# Patient Record
Sex: MALE | Race: OTHER | URBAN - METROPOLITAN AREA
[De-identification: names, ages, dates, MRNs, and addresses within clinical notes are randomized per-mention and may not be internally consistent; named-entity substitution may affect disease eponyms.]

---

## 2022-06-12 ENCOUNTER — INPATIENT (INPATIENT)
Facility: HOSPITAL | Age: 37
LOS: 2 days | Discharge: HOME | End: 2022-06-15
Attending: INTERNAL MEDICINE | Admitting: INTERNAL MEDICINE
Payer: MEDICAID

## 2022-06-12 VITALS
OXYGEN SATURATION: 98 % | HEART RATE: 80 BPM | RESPIRATION RATE: 16 BRPM | TEMPERATURE: 99 F | SYSTOLIC BLOOD PRESSURE: 140 MMHG | HEIGHT: 66 IN | DIASTOLIC BLOOD PRESSURE: 114 MMHG | WEIGHT: 179.02 LBS

## 2022-06-12 LAB — GLUCOSE BLDC GLUCOMTR-MCNC: 92 MG/DL — SIGNIFICANT CHANGE UP (ref 70–99)

## 2022-06-12 PROCEDURE — 99285 EMERGENCY DEPT VISIT HI MDM: CPT

## 2022-06-12 PROCEDURE — 70450 CT HEAD/BRAIN W/O DYE: CPT | Mod: 26,MA

## 2022-06-12 PROCEDURE — 71045 X-RAY EXAM CHEST 1 VIEW: CPT | Mod: 26

## 2022-06-12 PROCEDURE — 93010 ELECTROCARDIOGRAM REPORT: CPT

## 2022-06-12 NOTE — ED ADULT NURSE NOTE - ED STAT RN HANDOFF DETAILS
Report endorsed to oncoming RN Ruthy. Safety checks compld this shift/Safety rounds completed hourly.  IV sites checked Q2+remains WDL. Meds given as ord with no s/s of adverse RXNs. Fall +skin precs in place. Any issues endorsed to oncoming RN for follow up. Report endorsed to oncoming RN Norma. Safety checks compld this shift/Safety rounds completed hourly.  IV sites checked Q2+remains WDL. Meds given as ord with no s/s of adverse RXNs. Fall +skin precs in place. Any issues endorsed to oncoming RN for follow up.

## 2022-06-12 NOTE — ED ADULT NURSE NOTE - ED STAT RN HANDOFF DETAILS 2
Pt endorsed to this RN to ISO 21. Pt is pending transfer to [facility] from ED to Neuro Floor. Pt has b/l  strength, symmetrical smile, no tongue deviation, sensation intact in b/l upper extremities, pt can move left leg but says he is having difficulty moving right leg. Pt is A+Ox3, calm and cooperative. Unlabored breathing at this time. Abdomen soft and non-distended. Cap refill less than 2 seconds. MD evaluation in progress.

## 2022-06-12 NOTE — ED PROVIDER NOTE - PROGRESS NOTE DETAILS
called transfer center for potential transfer to [facility] and to contact pt.'s neuro/onc doc wife now states that she specifically saw L sided paralysis and L sided vision loss about 8:15 pm tonight, about 2 hours before ER arrival, which prompted her to call patient's neuro/onc doc, who told her to go to the nearest ER  I added CTA head/neck to specifically evaluate for large vessel embolus  still pending callback from transfer center regarding potential transfer to [facility] CTA negative  pt. accepted for transfer to [facility]: Pt will be admitted to [facility] (d/w Dr Bolton), pending eventual transfer to [facility].

## 2022-06-12 NOTE — ED PROVIDER NOTE - NSPREEXISTRELATION_ED_A_ED_FT
[Follow-Up: _____] : a [unfilled] follow-up visit [FreeTextEntry1] : Telephonic visit neuro/onc pt. at [facility]

## 2022-06-12 NOTE — ED PROVIDER NOTE - CARE PLAN
Principal Discharge DX:	CVA (cerebrovascular accident)   1 Principal Discharge DX:	Left-sided weakness

## 2022-06-12 NOTE — ED PROVIDER NOTE - CLINICAL SUMMARY MEDICAL DECISION MAKING FREE TEXT BOX
35 yo M with likely CVA, last known normal was 2 days prior, out of window for TPA  -basic labs, coags, ammonia, lactate, finger stick, rvp, trop, type and screen, ua, CT brain, CXR, ekg, iv, monitor  -f/u results, reeval

## 2022-06-12 NOTE — ED ADULT TRIAGE NOTE - CHIEF COMPLAINT QUOTE
hx inoperable brain tumor and receiving chemo. c/o left sided weakness. last seen normal, as per wife, is Friday. Dr. Evans paged to triage for stroke eval

## 2022-06-12 NOTE — ED PROVIDER NOTE - OBJECTIVE STATEMENT
37 yo M with L sided paralysis, decreased speech, R gaze palsy, noted by daughter today.  Last seen normal was Friday.  Pt. decompensating over last 2 days.  No other complaints or inciting event.   ROS: negative for fever, cough, headache, chest pain, shortness of breath, abd pain, nausea, vomiting, diarrhea, rash, paresthesia, and weakness--all other systems reviewed are negative.   PMH: inoperable brain tumor on chemo; Meds: Denies; SH: Denies smoking/drinking/drug use 35 yo M with L sided paralysis, decreased speech, R gaze palsy, noted by daughter today.  Last seen normal was Friday.  Pt. decompensating over last 2 days.  No other complaints or inciting event.  Known history of brain tumor s/p 2 resections and regrowth, now non-operable.    ROS: negative for fever, cough, headache, chest pain, shortness of breath, abd pain, nausea, vomiting, diarrhea, rash, paresthesia, and weakness--all other systems reviewed are negative.   PMH: Glioblastoma discovered August 2021, 2 surgeries (Aug 9, Nov 29), now inoperable on chemo; Meds: Lenvima, Keppra, Keytruda chemo infusions; SH: Denies smoking/drinking/drug use  History from Wife at bedside: Adina  PCP: STEVEN Paula 35 yo M with L sided paralysis, decreased speech, R gaze palsy, noted by wife today.  Last seen/known normal was Friday.  Pt. reportedly decompensating over last 2 days.  No other complaints or inciting event.  Known history of brain tumor s/p 2 resections and regrowth, now non-operable.    ROS: negative for fever, cough, headache, chest pain, shortness of breath, abd pain, nausea, vomiting, diarrhea, rash, paresthesia, and weakness--all other systems reviewed are negative.   PMH: Glioblastoma discovered August 2021, 2 surgeries (Aug 9, Nov 29), now inoperable on chemo; Meds: Lenvima, Keppra, Keytruda chemo infusions; SH: Denies smoking/drinking/drug use  History from Wife at bedside: Adina  Neuro/Onc: Dr. Hai Reaves, [facility] 35 yo M with L sided paralysis, decreased speech, R gaze palsy, noted by wife today--states that pt. had symptoms on waking this morning.  Last seen/known normal was Friday, but wife also admits pt. was physically weak all week.  Pt. reportedly decompensating over last 2 days.  Pt. could not get out of bed this morning, as per wife, due to weakness.  No other complaints or inciting event.  Known history of brain tumor s/p 2 resections and regrowth, now non-operable.    ROS: negative for fever, cough, headache, chest pain, shortness of breath, abd pain, nausea, vomiting, diarrhea, rash, paresthesia, and weakness--all other systems reviewed are negative.   PMH: Glioblastoma discovered August 2021, 2 surgeries (Aug 9, Nov 29), now inoperable on chemo; Meds: Lenvima, Keppra, Keytruda chemo infusions; SH: Denies smoking/drinking/drug use  History from Wife at bedside: Adina  Neuro/Onc: Dr. Hai Reaves, [place]

## 2022-06-12 NOTE — ED ADULT NURSE NOTE - OBJECTIVE STATEMENT
37 yo M with L sided paralysis, decreased speech, R gaze palsy, noted by daughter today.  Last seen normal was Friday.  Pt. decompensating over last 2 days.  No other complaints or inciting event.  Known history of brain tumor s/p 2 resections and regrowth, now non-operable.  Denies any pain or discomfort at this time. 35 yo M hx of Glioblastoma in head, presents to ED with L sided paralysis, decreased speech, R gaze palsy, noted by wife today, unknown time.  According to wife, pt was Last seen normal was Friday.  Pt. decompensating over last 2 days.  No other complaints or inciting event.  Known history of brain tumor s/p 2 resections and regrowth, now non-operable.  Denies any pain or discomfort at this time. 37 yo M AOx4, nkda, hx of Glioblastoma of the brain presents to ED with L sided paralysis, decreased speech, R gaze palsy, noted by wife today, unknown time.  According to wife, pt was Last seen normal Friday but had symptoms of weakness throughout the week.  Pt. decompensating over last 2 days.   Known history of brain tumor s/p 2 resections and regrowth, now non-operable.  Denies any chest pain, n/v/d, sob, abd pain, recent travels or sickness,  pain or discomfort at this time

## 2022-06-12 NOTE — ED PROVIDER NOTE - PHYSICAL EXAMINATION
Vitals: HTN at 140/114, otherwise WNL  Gen: slow to respond, difficulty speaking, laying uncomfortably in stretcher  Head: ncat, perrla, R gaze palsy   Neck: supple, no lymphadenopathy, no midline deviation  Heart: rrr, no m/r/g  Lungs: CTA b/l, no rales/ronchi/wheezes  Abd: soft, nontender, non-distended, no rebound or guarding  Ext: no clubbing/cyanosis/edema  Neuro: L sided UE and LE weakness, minimal motion against gravity Vitals: HTN at 140/114, otherwise WNL  Gen: slow to respond, speaking in Ecuadorean with slight aphasia, laying uncomfortably in stretcher  Head: ncat, perrla, R gaze palsy   Neck: supple, no lymphadenopathy, no midline deviation  Heart: rrr, no m/r/g  Lungs: CTA b/l, no rales/ronchi/wheezes  Abd: soft, nontender, non-distended, no rebound or guarding  Ext: no clubbing/cyanosis/edema  Neuro: L sided UE and LE paralysis Vitals: HTN at 140/114, otherwise WNL  Gen: slow to respond, speaking in Vincentian with slight aphasia, laying comfortably in stretcher  Head: ncat, perrla, R gaze palsy   Neck: supple, no lymphadenopathy, no midline deviation  Heart: rrr, no m/r/g  Lungs: CTA b/l, no rales/ronchi/wheezes  Abd: soft, nontender, non-distended, no rebound or guarding  Ext: no clubbing/cyanosis/edema  Neuro: L sided UE and LE paralysis

## 2022-06-13 DIAGNOSIS — C71.9 MALIGNANT NEOPLASM OF BRAIN, UNSPECIFIED: ICD-10-CM

## 2022-06-13 DIAGNOSIS — R00.0 TACHYCARDIA, UNSPECIFIED: ICD-10-CM

## 2022-06-13 DIAGNOSIS — G81.94 HEMIPLEGIA, UNSPECIFIED AFFECTING LEFT NONDOMINANT SIDE: ICD-10-CM

## 2022-06-13 LAB
ALBUMIN SERPL ELPH-MCNC: 3.8 G/DL — SIGNIFICANT CHANGE UP (ref 3.3–5)
ALP SERPL-CCNC: 64 U/L — SIGNIFICANT CHANGE UP (ref 40–120)
ALT FLD-CCNC: 26 U/L — SIGNIFICANT CHANGE UP (ref 12–78)
AMMONIA BLD-MCNC: 18 UMOL/L — SIGNIFICANT CHANGE UP (ref 11–32)
ANION GAP SERPL CALC-SCNC: 8 MMOL/L — SIGNIFICANT CHANGE UP (ref 5–17)
APPEARANCE UR: CLEAR — SIGNIFICANT CHANGE UP
APTT BLD: 30.4 SEC — SIGNIFICANT CHANGE UP (ref 27.5–35.5)
AST SERPL-CCNC: 24 U/L — SIGNIFICANT CHANGE UP (ref 15–37)
BASOPHILS # BLD AUTO: 0.03 K/UL — SIGNIFICANT CHANGE UP (ref 0–0.2)
BASOPHILS NFR BLD AUTO: 0.7 % — SIGNIFICANT CHANGE UP (ref 0–2)
BILIRUB SERPL-MCNC: 0.7 MG/DL — SIGNIFICANT CHANGE UP (ref 0.2–1.2)
BILIRUB UR-MCNC: NEGATIVE — SIGNIFICANT CHANGE UP
BLD GP AB SCN SERPL QL: SIGNIFICANT CHANGE UP
BUN SERPL-MCNC: 7 MG/DL — SIGNIFICANT CHANGE UP (ref 7–23)
CALCIUM SERPL-MCNC: 9.9 MG/DL — SIGNIFICANT CHANGE UP (ref 8.5–10.1)
CHLORIDE SERPL-SCNC: 103 MMOL/L — SIGNIFICANT CHANGE UP (ref 96–108)
CHOLEST SERPL-MCNC: 176 MG/DL — SIGNIFICANT CHANGE UP
CO2 SERPL-SCNC: 27 MMOL/L — SIGNIFICANT CHANGE UP (ref 22–31)
COLOR SPEC: YELLOW — SIGNIFICANT CHANGE UP
CREAT SERPL-MCNC: 0.76 MG/DL — SIGNIFICANT CHANGE UP (ref 0.5–1.3)
DIFF PNL FLD: NEGATIVE — SIGNIFICANT CHANGE UP
EGFR: 119 ML/MIN/1.73M2 — SIGNIFICANT CHANGE UP
EOSINOPHIL # BLD AUTO: 0.01 K/UL — SIGNIFICANT CHANGE UP (ref 0–0.5)
EOSINOPHIL NFR BLD AUTO: 0.2 % — SIGNIFICANT CHANGE UP (ref 0–6)
GLUCOSE SERPL-MCNC: 99 MG/DL — SIGNIFICANT CHANGE UP (ref 70–99)
GLUCOSE UR QL: NEGATIVE MG/DL — SIGNIFICANT CHANGE UP
HCT VFR BLD CALC: 47.7 % — SIGNIFICANT CHANGE UP (ref 39–50)
HDLC SERPL-MCNC: 20 MG/DL — LOW
HGB BLD-MCNC: 16.4 G/DL — SIGNIFICANT CHANGE UP (ref 13–17)
IMM GRANULOCYTES NFR BLD AUTO: 0 % — SIGNIFICANT CHANGE UP (ref 0–1.5)
INR BLD: 1.31 RATIO — HIGH (ref 0.88–1.16)
KETONES UR-MCNC: ABNORMAL
LACTATE SERPL-SCNC: 1.2 MMOL/L — SIGNIFICANT CHANGE UP (ref 0.7–2)
LEUKOCYTE ESTERASE UR-ACNC: ABNORMAL
LIPID PNL WITH DIRECT LDL SERPL: 125 MG/DL — HIGH
LYMPHOCYTES # BLD AUTO: 1.84 K/UL — SIGNIFICANT CHANGE UP (ref 1–3.3)
LYMPHOCYTES # BLD AUTO: 43.6 % — SIGNIFICANT CHANGE UP (ref 13–44)
MCHC RBC-ENTMCNC: 29.3 PG — SIGNIFICANT CHANGE UP (ref 27–34)
MCHC RBC-ENTMCNC: 34.4 G/DL — SIGNIFICANT CHANGE UP (ref 32–36)
MCV RBC AUTO: 85.3 FL — SIGNIFICANT CHANGE UP (ref 80–100)
MONOCYTES # BLD AUTO: 0.52 K/UL — SIGNIFICANT CHANGE UP (ref 0–0.9)
MONOCYTES NFR BLD AUTO: 12.3 % — SIGNIFICANT CHANGE UP (ref 2–14)
NEUTROPHILS # BLD AUTO: 1.82 K/UL — SIGNIFICANT CHANGE UP (ref 1.8–7.4)
NEUTROPHILS NFR BLD AUTO: 43.2 % — SIGNIFICANT CHANGE UP (ref 43–77)
NITRITE UR-MCNC: NEGATIVE — SIGNIFICANT CHANGE UP
NON HDL CHOLESTEROL: 156 MG/DL — HIGH
NRBC # BLD: 0 /100 WBCS — SIGNIFICANT CHANGE UP (ref 0–0)
PH UR: 6 — SIGNIFICANT CHANGE UP (ref 5–8)
PLATELET # BLD AUTO: 200 K/UL — SIGNIFICANT CHANGE UP (ref 150–400)
POTASSIUM SERPL-MCNC: 4.1 MMOL/L — SIGNIFICANT CHANGE UP (ref 3.5–5.3)
POTASSIUM SERPL-SCNC: 4.1 MMOL/L — SIGNIFICANT CHANGE UP (ref 3.5–5.3)
PROT SERPL-MCNC: 9 GM/DL — HIGH (ref 6–8.3)
PROT UR-MCNC: 30 MG/DL
PROTHROM AB SERPL-ACNC: 15.6 SEC — HIGH (ref 10.5–13.4)
RAPID RVP RESULT: DETECTED
RBC # BLD: 5.59 M/UL — SIGNIFICANT CHANGE UP (ref 4.2–5.8)
RBC # FLD: 13.9 % — SIGNIFICANT CHANGE UP (ref 10.3–14.5)
RBC CASTS # UR COMP ASSIST: SIGNIFICANT CHANGE UP /HPF (ref 0–4)
RV+EV RNA SPEC QL NAA+PROBE: DETECTED
SARS-COV-2 RNA SPEC QL NAA+PROBE: SIGNIFICANT CHANGE UP
SODIUM SERPL-SCNC: 138 MMOL/L — SIGNIFICANT CHANGE UP (ref 135–145)
SP GR SPEC: 1.01 — SIGNIFICANT CHANGE UP (ref 1.01–1.02)
TRIGL SERPL-MCNC: 157 MG/DL — HIGH
TROPONIN I, HIGH SENSITIVITY RESULT: 4.9 NG/L — SIGNIFICANT CHANGE UP
UROBILINOGEN FLD QL: 4 MG/DL
WBC # BLD: 4.22 K/UL — SIGNIFICANT CHANGE UP (ref 3.8–10.5)
WBC # FLD AUTO: 4.22 K/UL — SIGNIFICANT CHANGE UP (ref 3.8–10.5)
WBC UR QL: SIGNIFICANT CHANGE UP

## 2022-06-13 PROCEDURE — 70498 CT ANGIOGRAPHY NECK: CPT | Mod: 26,MA

## 2022-06-13 PROCEDURE — 70496 CT ANGIOGRAPHY HEAD: CPT | Mod: 26,MA

## 2022-06-13 PROCEDURE — 70552 MRI BRAIN STEM W/DYE: CPT | Mod: 26,MG

## 2022-06-13 PROCEDURE — G1004: CPT

## 2022-06-13 PROCEDURE — 99283 EMERGENCY DEPT VISIT LOW MDM: CPT

## 2022-06-13 RX ORDER — SODIUM CHLORIDE 9 MG/ML
1000 INJECTION INTRAMUSCULAR; INTRAVENOUS; SUBCUTANEOUS ONCE
Refills: 0 | Status: COMPLETED | OUTPATIENT
Start: 2022-06-13 | End: 2022-06-13

## 2022-06-13 RX ORDER — GLUCAGON INJECTION, SOLUTION 0.5 MG/.1ML
1 INJECTION, SOLUTION SUBCUTANEOUS ONCE
Refills: 0 | Status: DISCONTINUED | OUTPATIENT
Start: 2022-06-13 | End: 2022-06-15

## 2022-06-13 RX ORDER — DEXTROSE 50 % IN WATER 50 %
12.5 SYRINGE (ML) INTRAVENOUS ONCE
Refills: 0 | Status: DISCONTINUED | OUTPATIENT
Start: 2022-06-13 | End: 2022-06-15

## 2022-06-13 RX ORDER — DEXAMETHASONE 0.5 MG/5ML
10 ELIXIR ORAL ONCE
Refills: 0 | Status: DISCONTINUED | OUTPATIENT
Start: 2022-06-13 | End: 2022-06-13

## 2022-06-13 RX ORDER — LEVETIRACETAM 250 MG/1
500 TABLET, FILM COATED ORAL EVERY 12 HOURS
Refills: 0 | Status: DISCONTINUED | OUTPATIENT
Start: 2022-06-13 | End: 2022-06-13

## 2022-06-13 RX ORDER — DEXTROSE 50 % IN WATER 50 %
25 SYRINGE (ML) INTRAVENOUS ONCE
Refills: 0 | Status: DISCONTINUED | OUTPATIENT
Start: 2022-06-13 | End: 2022-06-15

## 2022-06-13 RX ORDER — DEXTROSE 50 % IN WATER 50 %
15 SYRINGE (ML) INTRAVENOUS ONCE
Refills: 0 | Status: DISCONTINUED | OUTPATIENT
Start: 2022-06-13 | End: 2022-06-15

## 2022-06-13 RX ORDER — DEXAMETHASONE 0.5 MG/5ML
10 ELIXIR ORAL ONCE
Refills: 0 | Status: COMPLETED | OUTPATIENT
Start: 2022-06-13 | End: 2022-06-13

## 2022-06-13 RX ORDER — ACETAMINOPHEN 500 MG
975 TABLET ORAL ONCE
Refills: 0 | Status: COMPLETED | OUTPATIENT
Start: 2022-06-13 | End: 2022-06-13

## 2022-06-13 RX ORDER — LEVETIRACETAM 250 MG/1
500 TABLET, FILM COATED ORAL
Refills: 0 | Status: DISCONTINUED | OUTPATIENT
Start: 2022-06-13 | End: 2022-06-15

## 2022-06-13 RX ORDER — SODIUM CHLORIDE 9 MG/ML
1000 INJECTION INTRAMUSCULAR; INTRAVENOUS; SUBCUTANEOUS
Refills: 0 | Status: DISCONTINUED | OUTPATIENT
Start: 2022-06-13 | End: 2022-06-15

## 2022-06-13 RX ORDER — ACETAMINOPHEN 500 MG
650 TABLET ORAL ONCE
Refills: 0 | Status: COMPLETED | OUTPATIENT
Start: 2022-06-13 | End: 2022-06-13

## 2022-06-13 RX ADMIN — LEVETIRACETAM 500 MILLIGRAM(S): 250 TABLET, FILM COATED ORAL at 13:33

## 2022-06-13 RX ADMIN — SODIUM CHLORIDE 1000 MILLILITER(S): 9 INJECTION INTRAMUSCULAR; INTRAVENOUS; SUBCUTANEOUS at 01:12

## 2022-06-13 RX ADMIN — LEVETIRACETAM 420 MILLIGRAM(S): 250 TABLET, FILM COATED ORAL at 13:18

## 2022-06-13 RX ADMIN — Medication 102 MILLIGRAM(S): at 15:22

## 2022-06-13 RX ADMIN — Medication 975 MILLIGRAM(S): at 01:11

## 2022-06-13 RX ADMIN — Medication 10 MILLIGRAM(S): at 15:52

## 2022-06-13 RX ADMIN — Medication 650 MILLIGRAM(S): at 10:34

## 2022-06-13 RX ADMIN — SODIUM CHLORIDE 1000 MILLILITER(S): 9 INJECTION INTRAMUSCULAR; INTRAVENOUS; SUBCUTANEOUS at 02:56

## 2022-06-13 RX ADMIN — Medication 650 MILLIGRAM(S): at 11:34

## 2022-06-13 RX ADMIN — LEVETIRACETAM 500 MILLIGRAM(S): 250 TABLET, FILM COATED ORAL at 22:21

## 2022-06-13 RX ADMIN — Medication 975 MILLIGRAM(S): at 02:56

## 2022-06-13 RX ADMIN — SODIUM CHLORIDE 75 MILLILITER(S): 9 INJECTION INTRAMUSCULAR; INTRAVENOUS; SUBCUTANEOUS at 19:31

## 2022-06-13 NOTE — CONSULT NOTE ADULT - PROBLEM SELECTOR RECOMMENDATION 2
Sinus tachy, likely due to dehydration  Continue IV NS  Monitor HR  poc glucose and Hypoglycemia protocol

## 2022-06-13 NOTE — CONSULT NOTE ADULT - PROBLEM SELECTOR RECOMMENDATION 3
36 years old male with h/o hamartoblastoma ( s/p surgery x 2 in 08/2021 and 11/2021, on chemo)  Now admitted with left hemiplegia  Pending transfer to [facility]

## 2022-06-13 NOTE — ED ADULT NURSE REASSESSMENT NOTE - NS ED NURSE REASSESS COMMENT FT1
Pt resting comfortably with wife berenice at bedside. Respirations even and unlabored. Nad noted. Pt is able to make all needs known. Cardiac and spo2 monitoring in place. Comfort and safety maintained. Awaiting bed at [facility], pt waiting to be transferred pending bed assignment.

## 2022-06-13 NOTE — CONSULT NOTE ADULT - PROBLEM SELECTOR RECOMMENDATION 9
present to ED with complain of left sided paralysis which was noted on 9PM yesterday ( 6/12/22). Patient could not get out of bed due to weakness. Last known normal was on Friday 6/10/22. Out of window for TPA  CT head with no acute intracranial hemorrhage or mass effect. Status post right frontal temporal craniotomy with and a surgical cavity in the right frontal region. CTA head/neck with patent major arterial vasculature  Discussed with neurology Dr Sexton over the phone, likely due to progression of malignancy. Hold of aspirin for risk of bleeding due to tumor. MRI brain with contrast if possible  s/p IV dexamethasone 10mg   Pending transfer to [facility]  Continue Keppra 500mg bid   Neuro check

## 2022-06-13 NOTE — CONSULT NOTE ADULT - ASSESSMENT
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 Patient could not get out of bed due to weakness. Last known normal was on Friday 6/10/22. No fever or chills.   Tachycardic, sat well at RA, BP stable. No leukocytosis, Plt 200, Cr 4.1, glucose 99, , hsTnT 4.9. CXR image reviewed, no focal consolidation. CT head with no acute intracranial hemorrhage or mass effect. Status post right frontal temporal craniotomy with and a surgical cavity in the right frontal region. CTA head/neck with patent major arterial vasculature.

## 2022-06-13 NOTE — ED ADULT NURSE REASSESSMENT NOTE - NS ED NURSE REASSESS COMMENT FT1
Pt resting in bed, on cardiac monitor, NAD noted.  Noted slight improvement in movement of left side of pts upper and lower extremity, Dr. Evans made aware. Pending transfer to [facility].  Plan of care updated.

## 2022-06-14 LAB
GLUCOSE BLDC GLUCOMTR-MCNC: 118 MG/DL — HIGH (ref 70–99)
GLUCOSE BLDC GLUCOMTR-MCNC: 142 MG/DL — HIGH (ref 70–99)

## 2022-06-14 PROCEDURE — 99232 SBSQ HOSP IP/OBS MODERATE 35: CPT

## 2022-06-14 RX ORDER — OXYCODONE HYDROCHLORIDE 5 MG/1
5 TABLET ORAL EVERY 6 HOURS
Refills: 0 | Status: DISCONTINUED | OUTPATIENT
Start: 2022-06-14 | End: 2022-06-15

## 2022-06-14 RX ORDER — DEXAMETHASONE 0.5 MG/5ML
10 ELIXIR ORAL ONCE
Refills: 0 | Status: COMPLETED | OUTPATIENT
Start: 2022-06-14 | End: 2022-06-14

## 2022-06-14 RX ORDER — MORPHINE SULFATE 50 MG/1
2 CAPSULE, EXTENDED RELEASE ORAL EVERY 4 HOURS
Refills: 0 | Status: DISCONTINUED | OUTPATIENT
Start: 2022-06-14 | End: 2022-06-15

## 2022-06-14 RX ORDER — ENOXAPARIN SODIUM 100 MG/ML
40 INJECTION SUBCUTANEOUS EVERY 24 HOURS
Refills: 0 | Status: DISCONTINUED | OUTPATIENT
Start: 2022-06-14 | End: 2022-06-15

## 2022-06-14 RX ORDER — OXYCODONE HYDROCHLORIDE 5 MG/1
5 TABLET ORAL ONCE
Refills: 0 | Status: DISCONTINUED | OUTPATIENT
Start: 2022-06-14 | End: 2022-06-15

## 2022-06-14 RX ADMIN — Medication 102 MILLIGRAM(S): at 23:12

## 2022-06-14 RX ADMIN — LEVETIRACETAM 500 MILLIGRAM(S): 250 TABLET, FILM COATED ORAL at 10:11

## 2022-06-14 RX ADMIN — ENOXAPARIN SODIUM 40 MILLIGRAM(S): 100 INJECTION SUBCUTANEOUS at 23:12

## 2022-06-14 RX ADMIN — LEVETIRACETAM 500 MILLIGRAM(S): 250 TABLET, FILM COATED ORAL at 17:27

## 2022-06-14 RX ADMIN — MORPHINE SULFATE 2 MILLIGRAM(S): 50 CAPSULE, EXTENDED RELEASE ORAL at 23:12

## 2022-06-14 NOTE — ED ADULT NURSE REASSESSMENT NOTE - NS ED NURSE REASSESS COMMENT FT1
Spoke with Dr. Gutierres and asked about patient's disposition because [facility] called for transfer. Dr. Gutierres to keep transfer to [facility] and to have bed board cancel room assignment. NM notified.

## 2022-06-14 NOTE — CHART NOTE - NSCHARTNOTEFT_GEN_A_CORE
Called by RN for pt c/o new onset pain and weakness to RLE.  Pt seen at bedside with his mother and  #[id number]/Guerda and later d/w pt's wife via phone  Pt follows commands but is minimally verbal; pt's mother answers most questions for him  Pt reports he presented initially for left sided weakness which has persisted. He now reports difficulty Called by RN for pt c/o new onset pain and weakness to RLE.  Pt seen at bedside with his mother and  #[id number]/Guerda and later d/w pt's wife via phone  Pt follows commands but is minimally verbal; pt's mother answers most questions for him  Pt reports he presented initially for left sided weakness which has persisted. He now reports increased pain and decreased movement of his RLE.  Unable to say if onset was gradual or sudden tonight, but he is now noticing it and unable to be comfortable.  Pt requests something stronger than tylenol for pain stating it really hasn't helped him. Pt and mom agree that he is able to take po meds and food at this time.    T(C): , Max: 37.2 (06-14-22 @ 07:29)  HR: 107 (06-14-22 @ 17:25) (101 - 115)  BP: 126/94 (06-14-22 @ 17:25) (126/94 - 146/102)  RR: 18 (06-14-22 @ 17:25) (15 - 20)  SpO2: 97% (06-14-22 @ 17:25) (96% - 98%)    Pt supine in bed. Holding cell phone in right hand  pt follows commands but is minimally verbal; says hello and alesia   RUE 3/5  RLE 2/5   LLE 2/5 with clonus    This is a 37yo M h/o glioblastoma s/p sx x2 and chemo, now in-operable presents with left sided parasthesia, CTH negative for acute bleed or stroke, likely representing progression of disease.  Pt is awaiting transfer to [facility] for continuity of care when bed is available.    Exam at this time does show weakness of bilat LE, unclear if changed from presenting complaints, but again likely represents known lesions.  - will tx pain with oxy ir 5mg po x1 for now, can offer additional meds prn  - will give additional dose decadron 10mg ivpb x 1   - start lovenox 40mg sq daily tonight for vte ppx  - spoke with [facility] transfer [place] regarding pending [facility] txf - transfer center last spoke with [facility] this afternoon and no beds were available. Txf center will continue communication and txf when bed available.  - continue to monitor

## 2022-06-14 NOTE — ED ADULT NURSE REASSESSMENT NOTE - NS ED NURSE REASSESS COMMENT FT1
Received patient in [facility]; alert and oriented x4. Denies any pain at this time. IV not flushing, removed. No swelling noted. Pending transfer to [facility].

## 2022-06-15 VITALS
SYSTOLIC BLOOD PRESSURE: 120 MMHG | DIASTOLIC BLOOD PRESSURE: 79 MMHG | OXYGEN SATURATION: 98 % | RESPIRATION RATE: 16 BRPM | HEART RATE: 90 BPM

## 2022-06-15 PROCEDURE — 93970 EXTREMITY STUDY: CPT | Mod: 26

## 2022-06-15 PROCEDURE — 99255 IP/OBS CONSLTJ NEW/EST HI 80: CPT

## 2022-06-15 PROCEDURE — 99239 HOSP IP/OBS DSCHRG MGMT >30: CPT

## 2022-06-15 RX ORDER — LEVETIRACETAM 250 MG/1
0 TABLET, FILM COATED ORAL
Qty: 0 | Refills: 3 | DISCHARGE

## 2022-06-15 RX ORDER — LENVATINIB 4 MG/1
0 CAPSULE ORAL
Qty: 0 | Refills: 0 | DISCHARGE

## 2022-06-15 RX ORDER — TRAMETINIB 0.5 MG/1
0 TABLET, FILM COATED ORAL
Qty: 0 | Refills: 0 | DISCHARGE

## 2022-06-15 RX ORDER — LOPERAMIDE HCL 2 MG
0 TABLET ORAL
Qty: 0 | Refills: 2 | DISCHARGE

## 2022-06-15 RX ORDER — DEXAMETHASONE 0.5 MG/5ML
1 ELIXIR ORAL
Qty: 60 | Refills: 0
Start: 2022-06-15 | End: 2022-07-14

## 2022-06-15 RX ORDER — OLANZAPINE 15 MG/1
0 TABLET, FILM COATED ORAL
Qty: 0 | Refills: 0 | DISCHARGE

## 2022-06-15 RX ORDER — PROCHLORPERAZINE MALEATE 5 MG
0 TABLET ORAL
Qty: 0 | Refills: 0 | DISCHARGE

## 2022-06-15 RX ORDER — LISINOPRIL 2.5 MG/1
0 TABLET ORAL
Qty: 0 | Refills: 3 | DISCHARGE

## 2022-06-15 RX ORDER — LEVETIRACETAM 250 MG/1
1 TABLET, FILM COATED ORAL
Qty: 0 | Refills: 0 | DISCHARGE
Start: 2022-06-15

## 2022-06-15 RX ADMIN — LEVETIRACETAM 500 MILLIGRAM(S): 250 TABLET, FILM COATED ORAL at 06:17

## 2022-06-15 RX ADMIN — SODIUM CHLORIDE 75 MILLILITER(S): 9 INJECTION INTRAMUSCULAR; INTRAVENOUS; SUBCUTANEOUS at 04:59

## 2022-06-15 RX ADMIN — MORPHINE SULFATE 2 MILLIGRAM(S): 50 CAPSULE, EXTENDED RELEASE ORAL at 09:52

## 2022-06-15 RX ADMIN — SODIUM CHLORIDE 75 MILLILITER(S): 9 INJECTION INTRAMUSCULAR; INTRAVENOUS; SUBCUTANEOUS at 06:17

## 2022-06-15 NOTE — DISCHARGE NOTE NURSING/CASE MANAGEMENT/SOCIAL WORK - NSDCPEFALRISK_GEN_ALL_CORE
For information on Fall & Injury Prevention, visit: [email or web address] OR  [email or web address] OR  https://www.cdc.gov/steadi/patient.html

## 2022-06-15 NOTE — PROGRESS NOTE ADULT - ASSESSMENT
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 Patient could not get out of bed due to weakness. Last known normal was on Friday 6/10/22. No fever or chills.   Tachycardic, sat well at RA, BP stable. No leukocytosis, Plt 200, Cr 4.1, glucose 99, , hsTnT 4.9. CXR image reviewed, no focal consolidation. CT head with no acute intracranial hemorrhage or mass effect. Status post right frontal temporal craniotomy with and a surgical cavity in the right frontal region. CTA head/neck with patent major arterial vasculature.      Problem/Recommendation - 1:  ·  Problem: Left hemiplegia.   ·  Recommendation: present to ED with complain of left sided paralysis which was noted on 9PM yesterday ( 6/12/22). Patient could not get out of bed due to weakness. Last known normal was on Friday 6/10/22. Out of window for TPA  CT head with no acute intracranial hemorrhage or mass effect. Status post right frontal temporal craniotomy with and a surgical cavity in the right frontal region. CTA head/neck with patent major arterial vasculature  Discussed with neurology Dr Sexton over the phone, likely due to progression of malignancy. Hold of aspirin for risk of bleeding due to tumor.   -MRI brain with contrast if possible  s/p IV dexamethasone 10mg   Pending transfer to [facility]  Continue Keppra 500mg bid   Neuro checks      Problem/Recommendation - 2:  ·  Problem: Tachycardia.   ·  Recommendation: Sinus tachy, likely due to dehydration  Continue IV NS  Monitor HR     Problem/Recommendation - 3:  ·  Problem: Glioblastoma multiforme.   ·  Recommendation: 36 years old male with h/o hamartoblastoma ( s/p surgery x 2 in 08/2021 and 11/2021, on chemo)  Now admitted with left hemiplegia  Pending transfer to [facility]. awaiting bed   
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 Patient could not get out of bed due to weakness. Last known normal was on Friday 6/10/22. No fever or chills.   Tachycardic, sat well at RA, BP stable. No leukocytosis, Plt 200, Cr 4.1, glucose 99, , hsTnT 4.9. CXR image reviewed, no focal consolidation. CT head with no acute intracranial hemorrhage or mass effect. Status post right frontal temporal craniotomy with and a surgical cavity in the right frontal region. CTA head/neck with patent major arterial vasculature.      Problem/Recommendation - 1:  ·  Problem: Left hemiplegia.   ·  Recommendation: present to ED with complain of left sided paralysis which was noted on 9PM yesterday ( 6/12/22). Patient could not get out of bed due to weakness. Last known normal was on Friday 6/10/22. Out of window for TPA  CT head with no acute intracranial hemorrhage or mass effect. Status post right frontal temporal craniotomy with and a surgical cavity in the right frontal region. CTA head/neck with patent major arterial vasculature  Discussed with neurology Dr Sexton over the phone, likely due to progression of malignancy. Hold of aspirin for risk of bleeding due to tumor.   -MRI brain with contrast if possible  s/p IV dexamethasone 10mg   Pending transfer to [facility]  Continue Keppra 500mg bid   Neuro checks      Problem/Recommendation - 2:  ·  Problem: Tachycardia.   ·  Recommendation: Sinus tachy, likely due to dehydration  Continue IV NS  Monitor HR     Problem/Recommendation - 3:  ·  Problem: Glioblastoma multiforme.   ·  Recommendation: 36 years old male with h/o hamartoblastoma ( s/p surgery x 2 in 08/2021 and 11/2021, on chemo)  Now admitted with left hemiplegia  Pending transfer to [facility]. awaiting bed

## 2022-06-15 NOTE — CHART NOTE - NSCHARTNOTEFT_GEN_A_CORE
NEURO-ONCOLOGY - [facility] [phone]    I was contacted via TEAMS by Edgard Perez regarding, "Dr Spangler asked me to reach out to you regarding an urgent issue at [facility]."    He informs me that this patient is under the care of Dr Hai Reaves at [facility] for an inoperable brain tumor. The patient was admitted on 6/12/2022 for new left sided weakness. They are in contact with [facility], who advised keppra and dexamethasone administration, which was done. A transfer was arranged, but has not happened yet. The inpatient Neurology team (Dr Sexton) has evaluated the patient.    A head CT shows no hemorrhage.    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 There is also a focus of hyperintensity involving the right internal capsule that is not DWI restricted and probably is responsible for his new left sided weakness. It is probably neoplasm.    I recommend the patient be discharged with outpatient follow-up with his treating neuro-oncologist, unless there is an urgent indication for hospitalization. Edgard Perez did not believe there was a need for inpatient care except for coordination of neuro-oncologic care.    If the patient is unable to follow-up with his neuro-oncologist, please call [phone] and I will coordinate care.    I spoke with Dr Reaves, who reports she is in communication with the patient and has no objection to discharge.

## 2022-06-15 NOTE — CONSULT NOTE ADULT - SUBJECTIVE AND OBJECTIVE BOX
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 Patient could not get out of bed due to weakness. Last known normal was on Friday 6/10/22. No fever or chills.   Tachycardic, sat well at RA, BP stable. No leukocytosis, Plt 200, Cr 4.1, glucose 99, , hsTnT 4.9. CXR image reviewed, no focal consolidation. CT head with no acute intracranial hemorrhage or mass effect. Status post right frontal temporal craniotomy with and a surgical cavity in the right frontal region. CTA head/neck with patent major arterial vasculature.     SH: no toxic habits  FH: No family h/o HTN, DM    MEDICATIONS    dextrose 50% Injectable 25 Gram(s) IV Push once  dextrose 50% Injectable 12.5 Gram(s) IV Push once  dextrose 50% Injectable 25 Gram(s) IV Push once  dextrose Oral Gel 15 Gram(s) Oral once  enoxaparin Injectable 40 milliGRAM(s) SubCutaneous every 24 hours  glucagon  Injectable 1 milliGRAM(s) IntraMuscular once  levETIRAcetam 500 milliGRAM(s) Oral two times a day  morphine  - Injectable 2 milliGRAM(s) IV Push every 4 hours PRN  oxyCODONE    IR 5 milliGRAM(s) Oral once PRN  oxyCODONE    IR 5 milliGRAM(s) Oral every 6 hours PRN  sodium chloride 0.9%. 1000 milliLiter(s) IV Continuous <Continuous>      PMH:      PSH:     Family history: No history of dementia, strokes, or seizures   FAMILY HISTORY:      SOCIAL HISTORY:  No history of tobacco or alcohol use     Allergies    No Known Allergies    Intolerances            Vital Signs Last 24 Hrs  T(C): 36.2 (14 Jun 2022 23:19), Max: 36.2 (14 Jun 2022 23:19)  T(F): 97.2 (14 Jun 2022 23:19), Max: 97.2 (14 Jun 2022 23:19)  HR: 92 (15 Irwin 2022 12:25) (92 - 111)  BP: 114/77 (15 Irwin 2022 12:25) (114/77 - 132/94)  BP(mean): --  RR: 16 (15 Irwin 2022 12:25) (15 - 20)  SpO2: 96% (15 Irwin 2022 12:25) (94% - 98%)      On Neurological Examination:    Mental Status - Patient is alert, awake, oriented X3. fluent, names, no dysarthria no aphasia Follows commands well and able to answer questions appropriately. Mood and affect  normal    Cranial Nerves - PERRL, EOMI, VFF, V1-V3 intact, no gross facial asymmetry, tongue/uvula midline    Motor Exam -   Right upper 4+  Left upper 4+  Right lower 3/5  Left lower  3/5     nml bulk/tone    Sensory    Intact to light touch and pinprick bilaterally    Coord: leftssided dysmetria  Gait -  unable     Reflexes:          brisk 2-3+ on right 3+ oon left with babinsky and clonus        LABS:              Hemoglobin A1C:             RADIOLOGY  < from: MR Head w/ IV Cont (06.13.22 @ 17:47) >  MRI of brain was performed using sagittal T1 axial T1 T2 T2 FLAIR   diffusion and gradient echo sequence. The patient was injected with   approximately 7 cc gadavist IV and sagittal coronal and axial T1-weighted   sequence performed. 3-D cc of IV contrast was discarded.    This exam is compared prior head CT performed on June 12, 2022    Postoperative changes compatible with a right frontal craniotomy is seen   with large extra-axial collection identified in the postoperative region.   This finding measures approximately 3.4 cm widest diameter. Mass effect   on the adjacent parenchyma is seen.    There is evidence of abnormal T2 prolongation seen adjacent to the postop   bed. This involves the right temporal frontal region with involvement of   the anterior corpus callosum as well as a smaller area involving the   right basal ganglia and anterior right thalamic region. This finding does   demonstrate subtle areas of underlying enhancement and could be   compatible with underlying neoplastic process given patient's history.   Comparison with prior studies are recommended if available. If no prior   studies are available the continued close follow-up is recommended. There   is evidence of localized mass effect seen consisting sulcal effacement.   No significant shift or herniation seen.    The large vessels demonstrate normal flow voids    Bilateral maxillary and right ethmoid sinus coastal thickening is seen.    Both mastoid and middle ear regions appear clear.    IMPRESSION: Postop changes are identified    Abnormal T2 prolongation with subtle enhancement identified as described   above. These findings are concerning for underlying neoplastic process   given patient's history.    --- End of Report ---    < end of copied text >                  
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 Patient could not get out of bed due to weakness. Last known normal was on Friday 6/10/22. No fever or chills.   Tachycardic, sat well at RA, BP stable. No leukocytosis, Plt 200, Cr 4.1, glucose 99, , hsTnT 4.9. CXR image reviewed, no focal consolidation. CT head with no acute intracranial hemorrhage or mass effect. Status post right frontal temporal craniotomy with and a surgical cavity in the right frontal region. CTA head/neck with patent major arterial vasculature.     SH: no toxic habits  FH: No family h/o HTN, DM      ROS  All ROS were negative except generalized weakness, left sided paralysis    Physical Exam  CONSTITUTIONAL: Well developed, well nourished, no acute distress  EYES: PERRL, no scleral icterus  ENT: Mucosa and tongue slightly dry   NECK: Neck supple, trachea midline, non-tender, no masses or thyromegaly.  CARDIAC: Normal S1 and S2. Regular rate and rhythms. No murmurs. No Pedal edema  LUNGS: Clear to auscultation, equal air entry both lungs Normal respiratory effort.   ABDOMEN: Soft, nondistended, nontender. No guarding or rebound tenderness. No hepatomegaly or splenomegaly. Bowel sound normal.   MUSCULOSKELETAL: Spine normal without deformity or tenderness. No significant deformity or joint abnormality  NEUROLOGICAL: Left sided paralysis +,   PSYCHIATRIC: A&O x 3, appropriate mood and affect

## 2022-06-15 NOTE — DISCHARGE NOTE NURSING/CASE MANAGEMENT/SOCIAL WORK - PATIENT PORTAL LINK FT
You can access the FollowMyHealth Patient Portal offered by [facility] by registering at the following website: [email or web address]. By joining [employer]’s FollowMyHealth portal, you will also be able to view your health information using other applications (apps) compatible with our system.

## 2022-06-15 NOTE — DISCHARGE NOTE PROVIDER - CARE PROVIDERS DIRECT ADDRESSES
,DirectAddress_Unknown,[email or web address]

## 2022-06-15 NOTE — PROGRESS NOTE ADULT - SUBJECTIVE AND OBJECTIVE BOX
HPI:      SUBJECTIVE & OBJECTIVE:   Pt seen and examined at bedside.   no overnight events.   awaiting bed at [facility]    REVIEW OF SYSTEMS: remaining ROS negative         PHYSICAL EXAM:  Vitals stable.     CONSTITUTIONAL:  no acute distress  EYES: PERRL, no scleral icterus  NECK: Neck supple, trachea midline  CARDIAC: Normal S1 and S2. Regular rate and rhythms. No murmurs.   LUNGS: CTAB  ABDOMEN: Soft, nondistended, nontender. Bowel sound normal.   NEUROLOGICAL: Left sided paralysis +,   PSYCHIATRIC: A&O x 3, appropriate mood and affect    MEDICATIONS  (STANDING):  dextrose 50% Injectable 25 Gram(s) IV Push once  dextrose 50% Injectable 12.5 Gram(s) IV Push once  dextrose 50% Injectable 25 Gram(s) IV Push once  dextrose Oral Gel 15 Gram(s) Oral once  enoxaparin Injectable 40 milliGRAM(s) SubCutaneous every 24 hours  glucagon  Injectable 1 milliGRAM(s) IntraMuscular once  levETIRAcetam 500 milliGRAM(s) Oral two times a day  sodium chloride 0.9%. 1000 milliLiter(s) (75 mL/Hr) IV Continuous <Continuous>    MEDICATIONS  (PRN):  morphine  - Injectable 2 milliGRAM(s) IV Push every 4 hours PRN Severe Pain (7 - 10)  oxyCODONE    IR 5 milliGRAM(s) Oral once PRN Moderate Pain (4 - 6)  oxyCODONE    IR 5 milliGRAM(s) Oral every 6 hours PRN Moderate Pain (4 - 6)      LABS:    labs noted             CAPILLARY BLOOD GLUCOSE                RECENT CULTURES:      RADIOLOGY & ADDITIONAL TESTS:          Imaging Personally Reviewed:  [ ] YES  [ ] NO    Consultant(s) Notes Reviewed:  [x ] YES  [ ] NO    Care Discussed with Consultants/Other Providers [x ] YES  [ ] NO    
Patient is a 36y old  Male who presents with a chief complaint of Left sided paralysis (13 Jun 2022 16:22)    INTERVAL HPI/OVERNIGHT EVENTS: no events     MEDICATIONS  (STANDING):  dextrose 50% Injectable 25 Gram(s) IV Push once  dextrose 50% Injectable 12.5 Gram(s) IV Push once  dextrose 50% Injectable 25 Gram(s) IV Push once  dextrose Oral Gel 15 Gram(s) Oral once  enoxaparin Injectable 40 milliGRAM(s) SubCutaneous every 24 hours  glucagon  Injectable 1 milliGRAM(s) IntraMuscular once  levETIRAcetam 500 milliGRAM(s) Oral two times a day  sodium chloride 0.9%. 1000 milliLiter(s) (75 mL/Hr) IV Continuous <Continuous>    MEDICATIONS  (PRN):  morphine  - Injectable 2 milliGRAM(s) IV Push every 4 hours PRN Severe Pain (7 - 10)  oxyCODONE    IR 5 milliGRAM(s) Oral once PRN Moderate Pain (4 - 6)  oxyCODONE    IR 5 milliGRAM(s) Oral every 6 hours PRN Moderate Pain (4 - 6)    Allergies    No Known Allergies    Intolerances      REVIEW OF SYSTEMS:  All other systems reviewed and are negative    Vital Signs Last 24 Hrs  T(C): 36.2 (14 Jun 2022 23:19), Max: 36.2 (14 Jun 2022 23:19)  T(F): 97.2 (14 Jun 2022 23:19), Max: 97.2 (14 Jun 2022 23:19)  HR: 92 (15 Irwin 2022 12:25) (92 - 111)  BP: 114/77 (15 Irwin 2022 12:25) (114/77 - 132/94)  BP(mean): --  RR: 16 (15 Irwin 2022 12:25) (15 - 20)  SpO2: 96% (15 Irwin 2022 12:25) (94% - 98%)  Daily     Daily   I&O's Summary    CAPILLARY BLOOD GLUCOSE        PHYSICAL EXAM:  GENERAL: NAD,    HEAD:  Atraumatic, Normocephalic  EYES: EOMI, PERRLA, conjunctiva and sclera clear  ENMT: No tonsillar erythema, exudates, or enlargement; Moist mucous membranes, Good dentition, No lesions  NECK: Supple, No JVD, Normal thyroid  NEUROLOGICAL: Left sided paralysis +,   CHEST/LUNG: Clear to percussion bilaterally; No rales, rhonchi, wheezing, or rubs  HEART: Regular rate and rhythm; No murmurs, rubs, or gallops  ABDOMEN: Soft, Nontender, Nondistended; Bowel sounds present  EXTREMITIES:  2+ Peripheral Pulses, No clubbing, cyanosis, or edema  LYMPH: No lymphadenopathy noted  SKIN: No rashes or lesions    Labs                                DVT prophylaxis: > Lovenox 40mg SQ daily  > Heparin   > SCD's

## 2022-06-15 NOTE — ED ADULT NURSE REASSESSMENT NOTE - NS ED NURSE REASSESS COMMENT FT1
patient A&OX3, evaluated by MD and cleared for discharge, discharge instructions given and patient verbalized full understanding. IV removed and pressure bandaged placed.  No active bleeding noted.

## 2022-06-15 NOTE — ED ADULT NURSE REASSESSMENT NOTE - NS ED NURSE REASSESS COMMENT FT1
Received patient for transfer to [facility]. Nad noted, Patient wife at bedside. Will continue to monitor

## 2022-06-15 NOTE — DISCHARGE NOTE PROVIDER - CARE PROVIDER_API CALL
,   Phone: (   )    -  Fax: (   )    -  Follow Up Time:     Naga Marks)  Neurology  [facility], [address] [place] [address]  Phone: [phone]  Fax: [phone]  Follow Up Time:

## 2022-06-15 NOTE — DISCHARGE NOTE PROVIDER - HOSPITAL COURSE
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 Patient could not get out of bed due to weakness. Last known normal was on Friday 6/10/22. No fever or chills.   Tachycardic, sat well at RA, BP stable. No leukocytosis, Plt 200, Cr 4.1, glucose 99, , hsTnT 4.9. CXR image reviewed, no focal consolidation. CT head with no acute intracranial hemorrhage or mass effect. Status post right frontal temporal craniotomy with and a surgical cavity in the right frontal region. CTA head/neck with patent major arterial vasculature.      Problem/Recommendation - 1:  ·  Problem: Left hemiplegia.   ·  Recommendation: present to ED with complain of left sided paralysis which was noted on 9PM yesterday ( 6/12/22). Patient could not get out of bed due to weakness. Last known normal was on Friday 6/10/22. Out of window for TPA  CT head with no acute intracranial hemorrhage or mass effect. Status post right frontal temporal craniotomy with and a surgical cavity in the right frontal region. CTA head/neck with patent major arterial vasculature  Discussed with neurology Dr Setxon over the phone, likely due to progression of malignancy. Hold of aspirin for risk of bleeding due to tumor.   -MRI brain with contrast completed   s/p IV dexamethasone 10mg   Continue Keppra 500mg bid     Case was reviewed by Neuro ONC, and discussed with Dr Reaves who tre[facility] and [facility]  pt is stable for discharge w outpatient followup      continue on keppra and decadron      pt seen and examined 45 min spent on dc planning     Lab test review, Radiology Review, Vitals review, Consultant review and discussion, Physical examination, IDR, Assessment and plan; Plan discussion with patient and family

## 2022-06-15 NOTE — CONSULT NOTE ADULT - ASSESSMENT
36 years old male with h/o GBM ( s/p surgery x 2 in 08/2021 and 11/2021, on chemo)  present to ED with complain of left sided paralysis . PE lsigniifcant eft sided weakness as above MRi brain postop changes with T2 prolongation in the right temporal frontal, corpus callosum and thalamus suggesting progressive disease    Impression progressive maliganncy    LE US negative  Planning on tx to [facility]  Consider Decadron 2mg BID  continue with Keppra   supportive care 36 years old male with h/o GBM ( s/p surgery x 2 in 08/2021 and 11/2021, on chemo)  present to ED with complain of left sided paralysis . 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

## 2022-06-21 DIAGNOSIS — R00.0 TACHYCARDIA, UNSPECIFIED: ICD-10-CM

## 2022-06-21 DIAGNOSIS — G81.94 HEMIPLEGIA, UNSPECIFIED AFFECTING LEFT NONDOMINANT SIDE: ICD-10-CM

## 2022-06-21 DIAGNOSIS — C71.9 MALIGNANT NEOPLASM OF BRAIN, UNSPECIFIED: ICD-10-CM

## 2022-06-21 DIAGNOSIS — Z92.21 PERSONAL HISTORY OF ANTINEOPLASTIC CHEMOTHERAPY: ICD-10-CM

## 2022-06-21 DIAGNOSIS — Q85.9 PHAKOMATOSIS, UNSPECIFIED: ICD-10-CM

## 2022-06-21 DIAGNOSIS — E86.0 DEHYDRATION: ICD-10-CM

## 2023-08-04 NOTE — DISCHARGE NOTE PROVIDER - NSDCCPCAREPLAN_GEN_ALL_CORE_FT
PRINCIPAL DISCHARGE DIAGNOSIS  Diagnosis: Left-sided weakness  Assessment and Plan of Treatment: continue with decadron and keppra  follow up with Dr Reaves for further management       O-T Advancement Flap Text: The defect edges were debeveled with a #15 scalpel blade.  Given the location of the defect, shape of the defect and the proximity to free margins an O-T advancement flap was deemed most appropriate.  Using a sterile surgical marker, an appropriate advancement flap was drawn incorporating the defect and placing the expected incisions within the relaxed skin tension lines where possible.    The area thus outlined was incised deep to adipose tissue with a #15 scalpel blade.  The skin margins were undermined to an appropriate distance in all directions utilizing iris scissors.

## 2024-06-02 NOTE — DISCHARGE NOTE PROVIDER - NSDCMRMEDTOKEN_GEN_ALL_CORE_FT
BETAMETHASONE VA 0.1% CREAM: apply topically twice a day  dexamethasone 2 mg oral tablet: 1 tab(s) orally 2 times a day   LENVIMA 20 MG DAILY DOSE 20 MG CAP:   levETIRAcetam 500 mg oral tablet: 1 tab(s) orally 2 times a day  LISINOPRIL 5 MG TABLET: take 1 tablet by mouth once daily IF SBP&gt;180 OR DBP &gt;110  LOPERAMIDE 2 MG CAPSULE: take 1 capsule by mouth AFTER FIRST LOOSE STOOL. FOLLOWED BY 1 CA...  (REFER TO PRESCRIPTION NOTES).  MEKINIST 0.5MG TAB:   OLANZAPINE 2.5MG TAB:   PROCHLORPERAZINE 5 MG TABLET: take 1 tablet by mouth every 8 hours if needed for nausea  
Attending Attestation (For Attendings USE Only)...
